# Patient Record
Sex: MALE | Race: WHITE | Employment: FULL TIME | URBAN - NONMETROPOLITAN AREA
[De-identification: names, ages, dates, MRNs, and addresses within clinical notes are randomized per-mention and may not be internally consistent; named-entity substitution may affect disease eponyms.]

---

## 2019-09-17 ENCOUNTER — TRANSFERRED RECORDS (OUTPATIENT)
Dept: HEALTH INFORMATION MANAGEMENT | Facility: OTHER | Age: 49
End: 2019-09-17

## 2019-09-26 ENCOUNTER — TRANSFERRED RECORDS (OUTPATIENT)
Dept: HEALTH INFORMATION MANAGEMENT | Facility: OTHER | Age: 49
End: 2019-09-26

## 2020-05-04 DIAGNOSIS — C61 HORMONE SENSITIVE PROSTATE CANCER (H): Primary | ICD-10-CM

## 2020-05-04 DIAGNOSIS — Z19.1 HORMONE SENSITIVE PROSTATE CANCER (H): Primary | ICD-10-CM

## 2020-05-04 NOTE — PROGRESS NOTES
"Order received via fax from [facility]: [facility] [place], [place] to administer Lupron (Leuprolide Acetate) 7.5 mg  IM for 2 doses, 8 weeks apart to the patient. [facility] is inquiring if [facility] could administer the injections due to COVID-19 situation and the patient resides in [place], [place]. Injection nurse spoke with Dr. Jonatan Wetzel regarding patient's case. Dr. Wetzel agreed to sign off on orders for patient to receive Lupron at [facility] after financial approval has been received. Financial Approval request submitted to ANGELY Cantor. Received confirmation from ANGELY Cantor that Lupron injection is a \"covered benefit through patient's health insurance.\" Orders for Lupron injection placed in Epic in Treatment Plan. Called pharmacy to review treatment plan and order medication into facility. Will have AFR call patient to schedule an appointment.       Maria Ines BOB, RN on 5/4/2020 at 1:32 PM    "

## 2020-05-08 ENCOUNTER — ALLIED HEALTH/NURSE VISIT (OUTPATIENT)
Dept: FAMILY MEDICINE | Facility: OTHER | Age: 50
End: 2020-05-08
Attending: FAMILY MEDICINE
Payer: COMMERCIAL

## 2020-05-08 ENCOUNTER — TRANSFERRED RECORDS (OUTPATIENT)
Dept: FAMILY MEDICINE | Facility: OTHER | Age: 50
End: 2020-05-08

## 2020-05-08 DIAGNOSIS — Z19.1 HORMONE SENSITIVE PROSTATE CANCER (H): Primary | ICD-10-CM

## 2020-05-08 DIAGNOSIS — C61 HORMONE SENSITIVE PROSTATE CANCER (H): Primary | ICD-10-CM

## 2020-05-08 PROCEDURE — 25000128 H RX IP 250 OP 636: Performed by: FAMILY MEDICINE

## 2020-05-08 PROCEDURE — 96402 CHEMO HORMON ANTINEOPL SQ/IM: CPT

## 2020-05-08 RX ADMIN — LEUPROLIDE ACETATE 7.5 MG: KIT at 13:15

## 2020-05-08 NOTE — PROGRESS NOTES
Verified patient's name and . Patient stated reason for visit today is to receive Lupron injection on behalf of [facility]: [facility] [place], [place]. Written order for Lupron injection and progress note from last visit with [facility] sent to HIM to be scanned into EHR.  Patient denied adverse reactions and concerns with previous injections. Lupron brought to injection room by pharmacy tech. Chemo precautions implemented. Lupron prepared and administered IM as ordered. Administration of medication documented in MAR (see MAR for further information regarding dose, lot #, NDC #, expiration date). Patient left clinic room ambulatory. Patient instructed to make next Lupron appointment for 8 weeks from today's visit.       Maria Ines MALAVEN, RN on 2020 at 1:20 PM